# Patient Record
(demographics unavailable — no encounter records)

---

## 2025-03-05 NOTE — PHYSICAL EXAM
[Right] : right knee [4___] : quadriceps 4[unfilled]/5 [5___] : hamstring 5[unfilled]/5 [] : no erythema [TWNoteComboBox7] : flexion 125 degrees [de-identified] : extension 0 degrees

## 2025-03-05 NOTE — HISTORY OF PRESENT ILLNESS
[Work related] : work related [7] : 7 [5] : 5 [Localized] : localized [Shooting] : shooting [Intermittent] : intermittent [Sleep] : sleep [Meds] : meds [Physical therapy] : physical therapy [Lying in bed] : lying in bed [de-identified] : 9/21/22:  s/p right knee acl recon on 9/12/22.  10/12/22:  follow up right knee.  mild pain.  improving with pt.  11/9/22:  follow up right knee.  improving with pt.  pain much less.  reports to weakness but getting better. 12/21/22:  patient had misstep and slipped the other day with some increaed swelling right knee. 1/18/23:  improved. 3/1/23: improving with HEP. 5/3/23: was doing well but then had a fall at a grocery store on 4/28/23.   3/5/25:  follow up right knee. Still intermittent pain and catching and clicking. [] : no [FreeTextEntry1] : right knee [FreeTextEntry3] : 4/15/2020 [FreeTextEntry5] : Patient reports she was walking at work and her right knee gave out under her.  s/p right knee acl recon on 9/12/22 [FreeTextEntry9] : Tylenol/ibuprofen  [de-identified] : prolonged walking  [de-identified] : 5/3/23 [de-identified] : Dr. Bean  [de-identified] : 2022 [de-identified] : not working

## 2025-03-05 NOTE — WORK
[Does not reveal pre-existing condition(s) that may affect treatment/prognosis] : does not reveal pre-existing condition(s) that may affect treatment/prognosis [Patient] : patient [No Rx restrictions] : No Rx restrictions. [I provided the services listed above] :  I provided the services listed above. [Moderate Partial] : moderate partial [Cannot return to work because ________] : cannot return to work because [unfilled]

## 2025-03-05 NOTE — DISCUSSION/SUMMARY
[de-identified] : The patient has one or more conditions that would benefit from physical therapy.  The physical therapy is requested to improve any deficit in pain, range of motion, strength and other problems in the affected body part(s) as noted in the physical examination above.  A prescription for physical therapy 2 - 3 times per week for 6 weeks was prescribed for the following body parts. Right knee.   The patient's orthopaedic condition(s) warrants intermittent use of a prescription strength non-steroidal anti-inflammatory medication (IBUPROFEN 600MG Po Bid PRN).  This medication is associated with risks including but not limited to gastrointestinal irritation, kidney damage, hypertension, and bleeding.  The patient understands and will take medication as prescribed.  The patient will stop the medication and consult a physician as needed if problems arise.   Will schedule the patient for an MRI of the right knee to assess the problem pending insurance authorization if necessary.  The patient has continued symptoms despite various treatment modalities.  The MRI will help to clarify the diagnosis and subsequent treatment plan. Rule out MMT.   Follow up after MRI.

## 2025-03-05 NOTE — ASSESSMENT
[FreeTextEntry1] : s/p right knee acl recon with hamstring autograft on 9/12/22, pmm, mild oa present.  Recurrent medial pain with some catching.  Possible new meniscus tear, possible mild chondral wear.  Possible exacerbation of oa versus new meniscus tear.  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 Denies other pmhx.

## 2025-03-26 NOTE — HISTORY OF PRESENT ILLNESS
[N] : Patient does not use contraception [Y] : Positive pregnancy history [No] : Patient does not have concerns regarding sex [Currently Active] : currently active [Men] : men [PapSmeardate] : 9/8/2023 [TextBox_31] : NEG [HIVDate] : 4/4/2024 [TextBox_53] : NEG [TextBox_58] : NEG [SyphilisDate] : 4/4/2024 [GonorrheaDate] : 9/8/2023 [TextBox_63] : NEG [ChlamydiaDate] : 9/8/2023 [TextBox_68] : NEG [HPVDate] : 9/8/2023 [TextBox_78] : NEG [LMPDate] : 3/15/2025 [PGxTotal] : 7 [[facility]] : 3 [[facility] Centeriving] : 3 [PGHxABInduced] : 2 [PGHxABSpont] : 2 [FreeTextEntry1] : 3/15/2025

## 2025-03-26 NOTE — HISTORY OF PRESENT ILLNESS
[N] : Patient does not use contraception [Y] : Positive pregnancy history [No] : Patient does not have concerns regarding sex [Currently Active] : currently active [Men] : men [PapSmeardate] : 9/8/2023 [TextBox_31] : NEG [HIVDate] : 4/4/2024 [TextBox_53] : NEG [TextBox_58] : NEG [SyphilisDate] : 4/4/2024 [GonorrheaDate] : 9/8/2023 [TextBox_63] : NEG [ChlamydiaDate] : 9/8/2023 [TextBox_68] : NEG [HPVDate] : 9/8/2023 [TextBox_78] : NEG [LMPDate] : 3/15/2025 [PGxTotal] : 7 [[facility] HospitallTerm] : 3 [[facility] Centeriving] : 3 [PGHxABInduced] : 2 [PGHxABSpont] : 2 [FreeTextEntry1] : 3/15/2025

## 2025-03-26 NOTE — PLAN
[FreeTextEntry1] : pt deferred exam today d/w pt the sono as unremarkable d/w pt an EMB is advised to r/o any pathology/dysplasia- she declined this today She reports her periods are also painful and that she was taking OCP that helped with flow and pain- she is requesting She was advised on options for LARC hormonal IUD as an option Rx for OCP- proper usage d/w pt Pt advised to keep diary of menses and RTC if heavy periods occur for evaluation RTC for next annual   She would like to try combined OCP. She denies significant medical history, including HTN, liver disease, breast CA, stroke, blood clot or clotting disorder, migraine with aura, lupus, and she does not smoke. We reviewed correct, daily administration of pills, and discussed the need to take a home pregnancy test if she ever feels any symptoms of pregnancy, since the pill is not 100% effective. Discussed potential common side effects, including irregular bleeding/ spotting. We also reviewed concerning signs and symptoms while using NATE that warrant emergent evaluation, such as severe abdominal pain, chest pain, shortness of breath, severe headache, sensory changes, pain/swelling/redness in one or both legs, as these could indicate a serious, potentially life-threatening condition.
[FreeTextEntry1] : pt deferred exam today d/w pt the sono as unremarkable d/w pt an EMB is advised to r/o any pathology/dysplasia- she declined this today She reports her periods are also painful and that she was taking OCP that helped with flow and pain- she is requesting She was advised on options for LARC hormonal IUD as an option Rx for OCP- proper usage d/w pt Pt advised to keep diary of menses and RTC if heavy periods occur for evaluation RTC for next annual   She would like to try combined OCP. She denies significant medical history, including HTN, liver disease, breast CA, stroke, blood clot or clotting disorder, migraine with aura, lupus, and she does not smoke. We reviewed correct, daily administration of pills, and discussed the need to take a home pregnancy test if she ever feels any symptoms of pregnancy, since the pill is not 100% effective. Discussed potential common side effects, including irregular bleeding/ spotting. We also reviewed concerning signs and symptoms while using NATE that warrant emergent evaluation, such as severe abdominal pain, chest pain, shortness of breath, severe headache, sensory changes, pain/swelling/redness in one or both legs, as these could indicate a serious, potentially life-threatening condition.
55

## 2025-07-23 NOTE — HISTORY OF PRESENT ILLNESS
[FreeTextEntry1] :  HPI:   Patient presents for gyn annual c/o of heavy menses on loestrin  (started 4 months ago); had menses twice this month, had nexplanon previously and it worked well. previously went to hospital 3 months ago for heavy menses no current bleeding      LMP: 7/15/25    Last pap:  neg cytology, neg hpv  Last MMG: never    Contraception: tubal  Sexually active: yes  Gardasil hx: unsure    PMH: denies PSH: gastric bypass, knee surgery  ALL: nkda  Sochx:  denies etoh, illicit, tobacco OBHx:  x 4. TOP x 2. SAB x 2.  ---------------------------------------------------------------------------------------------------------   ASSESSMENT & PLAN:    39 y/o   #gyn annual   -ASCCP guidelines reviewed; pap, hpv  -STI screen offered: declines  -encourage pcp/gyn/dermatology care annually -MMG/US script; site list  #heavy menses -refill loestrin  to overlap w/ nexplanon when inserted  -offered increased ocp dose -discussed LARC; r/b/a -last US 3/2025    rto 1-3 wk nexplanon insertion   Dr. Lori Mason, DO, MPH, FACOG

## 2025-07-23 NOTE — PHYSICAL EXAM
[Chaperoned Physical Exam] : A chaperone was present in the examining room during all aspects of the physical examination. [MA] : MA [Appropriately responsive] : appropriately responsive [Alert] : alert [No Acute Distress] : no acute distress [No Lymphadenopathy] : no lymphadenopathy [Soft] : soft [Non-tender] : non-tender [Non-distended] : non-distended [Oriented x3] : oriented x3 [FreeTextEntry3] : mobile thyroid, no masses, no nodules  [FreeTextEntry6] : No cervical or axillary lymphadenopathy.  [Examination Of The Breasts] : a normal appearance [No Masses] : no breast masses were palpable [Labia Majora] : normal [Labia Minora] : normal [Normal] : normal [Uterine Adnexae] : normal

## 2025-07-23 NOTE — HISTORY OF PRESENT ILLNESS
[FreeTextEntry1] :  HPI:   Patient presents for gyn annual c/o of heavy menses on loestrin  (started 4 months ago); had menses twice this month, had nexplanon previously and it worked well. previously went to hospital 3 months ago for heavy menses no current bleeding      LMP: 7/15/25    Last pap:  neg cytology, neg hpv  Last MMG: never    Contraception: tubal  Sexually active: yes  Gardasil hx: unsure    PMH: denies PSH: gastric bypass, knee surgery  ALL: nkda  Sochx:  denies etoh, illicit, tobacco OBHx:  x 4. TOP x 2. SAB x 2.  ---------------------------------------------------------------------------------------------------------   ASSESSMENT & PLAN:    41 y/o   #gyn annual   -ASCCP guidelines reviewed; pap, hpv  -STI screen offered: declines  -encourage pcp/gyn/dermatology care annually -MMG/US script; site list  #heavy menses -refill loestrin  to overlap w/ nexplanon when inserted  -offered increased ocp dose -discussed LARC; r/b/a -last US 3/2025    rto 1-3 wk nexplanon insertion   Dr. Lori Mason, DO, MPH, FACOG